# Patient Record
Sex: FEMALE | URBAN - METROPOLITAN AREA
[De-identification: names, ages, dates, MRNs, and addresses within clinical notes are randomized per-mention and may not be internally consistent; named-entity substitution may affect disease eponyms.]

---

## 2020-04-06 ENCOUNTER — COMMUNICATION - HEALTHEAST (OUTPATIENT)
Dept: SCHEDULING | Facility: CLINIC | Age: 20
End: 2020-04-06

## 2021-06-07 NOTE — TELEPHONE ENCOUNTER
"Onset of symptoms 4 days ago:  \"Full Body aches.\"  \"Shivering chills.\"  \"Cough.\"  Fever -> 100.6 at onset.  Uncertain of temp today.  Nausea.  Diarrhea.    History of asthma.  Uses inhaler.  Drinking tea.  Mild shortness of breath persists.    Known exposure to friend who tested positive for COVID19.    Pt reports [employer] is NOT an option since no health insurance at this time.  States \"Website doesn't work without insurance.\"  Pt prefers going to ED.  Intends to choose a hospital [place] [facility] [place] [facility] [place].    Ca Srivastava RN  Care Connection Triage     Reason for Disposition    MILD difficulty breathing (e.g., minimal/no SOB at rest, SOB with walking, pulse <100)    [facility] Specific Disposition - REQUIRED: [facility] Specific Patient Instructions  COVID 19 Nurse Triage Plan/Patient Instructions    Please be aware that novel coronavirus (COVID-19) may be circulating in the community. If you develop symptoms such as fever, cough, or SOB or if you have concerns about the presence of another infection including coronavirus (COVID-19), please contact your health care provider or visit [email or web address].       Patient to go to ED and follow protocol based instructions. Follow System Ambulatory Workflow for COVID 19.     Bring Your Own Device:  Please also bring your smart device(s) (smart phones, tablets, laptops) and their charging cables for your personal use and to communicate with your care team during your visit.    Thank you for limiting contact with others, wearing a simple mask to cover your cough, practice good hand hygiene habits and accessing our virtual services where possible to limit the spread of this virus.    For more information about COVID19 and options for caring for yourself at home, please visit the CDC website at https://www.cdc.gov/coronavirus/2019-ncov/about/steps-when-sick.html  For more options for care at [facility], please visit our website at " [email or web address]    For more information, please use the [place] Department [facility] ([facility]) COVID-19 Hotlines (Interpreters available):   Health questions: Phone Number: [phone] or 1-123.250.1983 and Hours: 7 a.m. to 7 p.m.  Schools and  questions: Phone Number: [phone] or [phone] and Hours 7 a.m. to 7 p.m.    Protocols used: CORONAVIRUS (COVID-19) DIAGNOSED OR [id number] 3.30.20